# Patient Record
Sex: MALE | URBAN - METROPOLITAN AREA
[De-identification: names, ages, dates, MRNs, and addresses within clinical notes are randomized per-mention and may not be internally consistent; named-entity substitution may affect disease eponyms.]

---

## 2019-10-28 ENCOUNTER — OFFICE VISIT (OUTPATIENT)
Dept: URBAN - METROPOLITAN AREA CLINIC 23 | Facility: CLINIC | Age: 39
End: 2019-10-28
Payer: COMMERCIAL

## 2019-10-28 DIAGNOSIS — B02.39 OTHER HERPES ZOSTER EYE DISEASE: Primary | ICD-10-CM

## 2019-10-28 DIAGNOSIS — H44.132 SYMPATHETIC UVEITIS, LEFT EYE: ICD-10-CM

## 2019-10-28 PROCEDURE — 99202 OFFICE O/P NEW SF 15 MIN: CPT | Performed by: OPTOMETRIST

## 2019-10-28 PROCEDURE — 99203 OFFICE O/P NEW LOW 30 MIN: CPT | Performed by: OPTOMETRIST

## 2019-10-28 RX ORDER — PREDNISOLONE ACETATE 10 MG/ML
1 % SUSPENSION/ DROPS OPHTHALMIC
Qty: 1 | Refills: 0 | Status: ACTIVE
Start: 2019-10-28

## 2019-10-28 ASSESSMENT — INTRAOCULAR PRESSURE
OD: 15
OS: 12

## 2019-10-28 NOTE — IMPRESSION/PLAN
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 Plan: Discussed findings. Continue valacyclovir as prescribed by PCP.

## 2019-10-28 NOTE — IMPRESSION/PLAN
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 Plan: Discussed findings. Pt to use prednisolone QID x 1 week, TID x 1 week, BID x 1 week, QD x 1 week, then d/c. Return for f/u check for corneal staining. If no staining, taper as instructed.

## 2019-10-31 ENCOUNTER — OFFICE VISIT (OUTPATIENT)
Dept: URBAN - METROPOLITAN AREA CLINIC 23 | Facility: CLINIC | Age: 39
End: 2019-10-31
Payer: COMMERCIAL

## 2019-10-31 PROCEDURE — 99213 OFFICE O/P EST LOW 20 MIN: CPT | Performed by: OPTOMETRIST

## 2019-10-31 PROCEDURE — 99212 OFFICE O/P EST SF 10 MIN: CPT | Performed by: OPTOMETRIST

## 2019-10-31 NOTE — IMPRESSION/PLAN
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 Plan: Discussed findings. Inflammation seen. Pt to use prednisolone every 2 hrs today and tomorrow, then go back to QID and taper.

## 2019-11-11 ENCOUNTER — OFFICE VISIT (OUTPATIENT)
Dept: URBAN - METROPOLITAN AREA CLINIC 23 | Facility: CLINIC | Age: 39
End: 2019-11-11
Payer: COMMERCIAL

## 2019-11-11 DIAGNOSIS — H04.123 DRY EYE SYNDROME OF BILATERAL LACRIMAL GLANDS: Primary | ICD-10-CM

## 2019-11-11 PROCEDURE — 99212 OFFICE O/P EST SF 10 MIN: CPT | Performed by: OPTOMETRIST

## 2019-11-11 ASSESSMENT — INTRAOCULAR PRESSURE
OS: 13
OD: 15

## 2019-11-11 NOTE — IMPRESSION/PLAN
Impression: Dry eye syndrome of bilateral lacrimal glands: H04.123. Plan: Discussed findings. No dendrites seen. No cell or flare. Continue tapering of prednisolone TID x 1 week, BID x 1 week, QD x 1 week, then d/c. Use AT during the day for comfort. Lowered tear production after inflammation. Use tears more when using computer/reading. Use sunglasses outside and sunscreen.